# Patient Record
Sex: FEMALE | URBAN - METROPOLITAN AREA
[De-identification: names, ages, dates, MRNs, and addresses within clinical notes are randomized per-mention and may not be internally consistent; named-entity substitution may affect disease eponyms.]

---

## 2024-01-03 DIAGNOSIS — Z31.41 FERTILITY TESTING: ICD-10-CM

## 2024-01-03 DIAGNOSIS — Z01.812 ENCOUNTER FOR PREPROCEDURAL LABORATORY EXAMINATION: ICD-10-CM

## 2024-01-03 DIAGNOSIS — Z31.7 ENCOUNTER FOR PROCREATIVE MANAGEMENT AND COUNSELING FOR GESTATIONAL CARRIER: ICD-10-CM

## 2024-01-13 ASSESSMENT — LIFESTYLE VARIABLES
TOBACCO_USE: NO
HISTORY_ALCOHOL_USE: NO

## 2024-01-17 ENCOUNTER — PREP FOR PROCEDURE (OUTPATIENT)
Dept: ENDOCRINOLOGY | Facility: CLINIC | Age: 34
End: 2024-01-17

## 2024-01-17 ENCOUNTER — HOSPITAL ENCOUNTER (OUTPATIENT)
Dept: ENDOCRINOLOGY | Facility: CLINIC | Age: 34
Discharge: HOME | End: 2024-01-17

## 2024-01-17 ENCOUNTER — TELEMEDICINE (OUTPATIENT)
Dept: BEHAVIORAL HEALTH | Facility: CLINIC | Age: 34
End: 2024-01-17
Payer: COMMERCIAL

## 2024-01-17 ENCOUNTER — ANCILLARY PROCEDURE (OUTPATIENT)
Dept: ENDOCRINOLOGY | Facility: CLINIC | Age: 34
End: 2024-01-17

## 2024-01-17 ENCOUNTER — CONSULT (OUTPATIENT)
Dept: ENDOCRINOLOGY | Facility: CLINIC | Age: 34
End: 2024-01-17

## 2024-01-17 VITALS
HEART RATE: 83 BPM | DIASTOLIC BLOOD PRESSURE: 75 MMHG | WEIGHT: 155 LBS | SYSTOLIC BLOOD PRESSURE: 118 MMHG | HEIGHT: 62 IN | TEMPERATURE: 97.9 F | BODY MASS INDEX: 28.52 KG/M2

## 2024-01-17 VITALS
RESPIRATION RATE: 20 BRPM | BODY MASS INDEX: 28.6 KG/M2 | HEART RATE: 82 BPM | SYSTOLIC BLOOD PRESSURE: 135 MMHG | DIASTOLIC BLOOD PRESSURE: 83 MMHG | WEIGHT: 155.42 LBS | HEIGHT: 62 IN | OXYGEN SATURATION: 96 % | TEMPERATURE: 98.1 F

## 2024-01-17 DIAGNOSIS — Z31.69 INFERTILITY COUNSELING: Primary | ICD-10-CM

## 2024-01-17 DIAGNOSIS — Z31.7 ENCOUNTER FOR PROCREATIVE MANAGEMENT AND COUNSELING FOR GESTATIONAL CARRIER: Primary | ICD-10-CM

## 2024-01-17 DIAGNOSIS — Z31.7 ENCOUNTER FOR PROCREATIVE MANAGEMENT AND COUNSELING FOR GESTATIONAL CARRIER: ICD-10-CM

## 2024-01-17 LAB — PREGNANCY TEST URINE, POC: NEGATIVE

## 2024-01-17 PROCEDURE — 99215 OFFICE O/P EST HI 40 MIN: CPT | Performed by: NURSE PRACTITIONER

## 2024-01-17 PROCEDURE — 99205 OFFICE O/P NEW HI 60 MIN: CPT | Performed by: NURSE PRACTITIONER

## 2024-01-17 PROCEDURE — 64450 NJX AA&/STRD OTHER PN/BRANCH: CPT | Performed by: OBSTETRICS & GYNECOLOGY

## 2024-01-17 PROCEDURE — 58555 HYSTEROSCOPY DX SEP PROC: CPT | Performed by: OBSTETRICS & GYNECOLOGY

## 2024-01-17 PROCEDURE — 90791 PSYCH DIAGNOSTIC EVALUATION: CPT | Performed by: PSYCHOLOGIST

## 2024-01-17 RX ORDER — KETOROLAC TROMETHAMINE 30 MG/ML
30 INJECTION, SOLUTION INTRAMUSCULAR; INTRAVENOUS ONCE AS NEEDED
Status: CANCELLED | OUTPATIENT
Start: 2024-01-17 | End: 2024-01-22

## 2024-01-17 RX ORDER — ACETAMINOPHEN 325 MG/1
650 TABLET ORAL ONCE AS NEEDED
Status: DISCONTINUED | OUTPATIENT
Start: 2024-01-17 | End: 2024-01-18 | Stop reason: HOSPADM

## 2024-01-17 RX ORDER — ACETAMINOPHEN 325 MG/1
650 TABLET ORAL ONCE AS NEEDED
Status: CANCELLED | OUTPATIENT
Start: 2024-01-17

## 2024-01-17 RX ORDER — KETOROLAC TROMETHAMINE 30 MG/ML
30 INJECTION, SOLUTION INTRAMUSCULAR; INTRAVENOUS ONCE AS NEEDED
Status: DISCONTINUED | OUTPATIENT
Start: 2024-01-17 | End: 2024-01-18 | Stop reason: HOSPADM

## 2024-01-17 ASSESSMENT — PAIN SCALES - GENERAL
PAINLEVEL: 0-NO PAIN
PAINLEVEL_OUTOF10: 0 - NO PAIN
PAINLEVEL_OUTOF10: 0 - NO PAIN

## 2024-01-17 ASSESSMENT — PAIN - FUNCTIONAL ASSESSMENT: PAIN_FUNCTIONAL_ASSESSMENT: 0-10
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
## 2024-01-17 NOTE — PROGRESS NOTES
Boarding Pass Gestational Carrier Checklist    Age: 33 y.o.      Provider: Coby Tolliver, CNP  Primary RN: Viviana Moore  Reasons for Treatment:  Gestational Carrier for Katty Wallace  Last BMI  24 : 28.43 kg/m²       OB HISTORY  2013 FT , Spontaneous labor at term, no complications in pregnancy, no complications with delivery, no complications postpartum,  x 6 months, menses resumed while breastfeeding  3/2015 FT , Spontaneous labor at term, no complications in pregnancy, no complications with delivery, no complications postpartum,  x 2 months, menses resumed after breastfeeding ceased   2018 FT , Spontaneous labor at term, no complications in pregnancy, no complications with delivery, no complications postpartum, did not breastfeed, menses resumed 6 weeks postpartum     No past medical history on file.  Ectopic Risk: No    Date Done Consultation Results/Comments    Prenatal Records Reviewed: Yes (Viviana Moore RN)    24 Provider Consult Coby Tolliver   23 +  24 Psych Okay to proceed? Yes  Has already done psychology eval, meeting with [facility] on  *Fady needs PRACHI-- completed and normal   24 GC Consent Received and in chart: Yes (Viviana Moore RN)   24 Nursing Teaching: Yes (Viviana Moore, RN)  Cycle Calendar: Yes (Viviana Moore RN)   3/2024 Legal Clearance letter received date: 3/12/24   8/31/23 OB Clearance Clearance for pregnancy: Yes (Viviana Moore RN)    Carrier Insurance Carrier Insurance Verified with IP: Agency to ensure insurance is verified    MFM Consult Okay to proceed? N/A    Other    Date Done Female Labs Results/Comments   24 Physical Exam Yes (Viviana Moore RN) by Coby Tolliver   24 Uterine Cavity Eval HSG:   SIS:   Hyster: (2024) Procedure: Diagnostic Hysteroscopy   Preop diagnosis: fertility testing  Post op diagnosis: Same   Assistant: Dr. Andrade    Anesthesia: None   IV: None   EBL: 3 cc  Specimens: None    Complications: None   Risks benefits and alternatives of the procedure explained to the patient and informed consent was obtained. Urine pregnancy test was performed and was negative. Time out was performed. The patient was placed in the dorsal lithotomy position and a sterile speculum was placed in the vagina. The cervix was sterilized with Betadine x3. Paracervical block with lidocaine 1% was administered.   Tenaculum: No  Dilation: No  The hysteroscope was placed in the cervix and advanced into the uterine cavity. Normal saline was used for distension media. Images were obtained and findings noted as below.   All instruments were then removed. Good hemostasis was noted. Patient tolerated the procedure well returned to the recovery area in stable condition. .   Findings:   Cavity: Smooth cavity no lesions noted  Ostia: Bilateral tubal ostia visualized  Additional Notes:    Attending Attestation: I was physically present for key and critical portions performed by the fellow. I reviewed the fellow's documentation and discussed the patient with the fellow. I agree with the fellow's medical decision making as documented in the fellow's note.             Covid Vaccine No; Intended Parent Informed by Provider & GC counseled on risk with documentation from provider in chart:    N/A FDA Waiver (if applicable) Reason for ineligibility: N/A  Date signed: N/A   1/17/24 Urine Toxicology Amphetamine: NEGATIVE  Barbiturate: NEGATIVE  Benzodiazepines: NEGATIVE  Cannabinoid: NEGATIVE  Cocaine: NEGATIVE  Fentanyl: NEGATIVE  Methadone: NEGATIVE  Oxycodone: NEGATIVE  Opiate: NEGATIVE  PCP: NEGATIVE    Nicotine Screening (if applicable) N/A   1/17/24 T&S (Q 1 Year) ABO: O  Rh: POS  Antibody: NEG   1/17/24 Hep B sAg NONREACTIVE   1/17/24 Hep C AB NONREACTIVE   1/17/24 HIV NONREACTIVE   1/17/24 Syphilis NONREACTIVE   1/17/24 GC/CT GC: NEGATIVE  CT: NEGATIVE   1/17/24 Rubella (Q 5 Years) IMMUNE     1/17/24 Varicella (Q 5 Years)  IMMUNE     1/17/24 CMV IgG: REACTIVE  IgM: NEGATIVE   8/31/23 Pap Smear (Q 5 Years) NEGATIVE FOR INTRAEPITHELIAL LESION OR MALIGNANCY  HPV: NEGATIVE    Mammogram ( > 40) (Q 1 Year) N/A   Date Done Male Labs (if applicable)  Fady Ervin [birth year] Results/Comments   1/17/24 Hep B sAg Nonreactive   1/17/24 Hep C AB  Nonreactive   1/17/24 HIV Nonreactive   1/17/24 Syphilis Nonreactive   1/17/24 GC/CT Negative   N/A FDA Waiver (if applicable) Date Signed: N/A   Date Done Miscellaneous Results/Comments    >= 45 Checklist  Added to chart:   No    [facility] Interval FET Checklist Added to chart:   SEE IP'S CHART   **Does not need to be completed prior to placing on IVF calendar**    Reviewed and approved by JALYN RODRIGUEZ on 3/12/24 at 3:47 PM.

## 2024-01-17 NOTE — PROGRESS NOTES
Patient ID: Dixie Ervin is a 33 y.o. female.    Hysteroscopy diagnostic    Date/Time: 1/17/2024 2:54 PM    Performed by: Ger Pizarro MD  Authorized by: SUREKHA Wolfe    Consent:     Consent obtained:  Verbal and written    Consent given by:  Patient    Risks, benefits, and alternatives were discussed: yes      Risks discussed:  Bleeding, infection and pain  Universal protocol:     Procedure explained and questions answered to patient or proxy's satisfaction: yes      Relevant documents present and verified: yes      Test results available: yes      Imaging studies available: yes      Required blood products, implants, devices, and special equipment available: yes      Immediately prior to procedure, a time out was called: yes      Patient identity confirmed:  Verbally with patient, arm band and hospital-assigned identification number  Pre-procedure details:     Skin preparation:  Povidone-iodine  Sedation:     Sedation type:  None  Anesthesia:     Anesthesia method:  Nerve block    Block anesthetic:  Lidocaine 1% w/o epi    Block technique:  Paracervical block  Procedure specific details:      Procedure: Diagnostic Hysteroscopy   Preop diagnosis: fertility testing  Post op diagnosis: Same   Assistant: Dr. Adnrade    Anesthesia: None   IV: None   EBL: 3 cc  Specimens: None   Complications: None   Risks benefits and alternatives of the procedure explained to the patient and informed consent was obtained. Urine pregnancy test was performed and was negative. Time out was performed. The patient was placed in the dorsal lithotomy position and a sterile speculum was placed in the vagina. The cervix was sterilized with Betadine x3. Paracervical block with lidocaine 1% was administered.   Tenaculum: No  Dilation: No  The hysteroscope was placed in the cervix and advanced into the uterine cavity. Normal saline was used for distension media. Images were obtained and findings noted as below.   All instruments  were then removed. Good hemostasis was noted. Patient tolerated the procedure well returned to the recovery area in stable condition. .   Findings:   Cavity: Smooth cavity no lesions noted  Ostia: Bilateral tubal ostia visualized  Additional Notes:    Attending Attestation: I was physically present for key and critical portions performed by the fellow. I reviewed the fellow's documentation and discussed the patient with the fellow. I agree with the fellow's medical decision making as documented in the fellow's note.           Post-procedure details:     Procedure completion:  Tolerated well, no immediate complications

## 2024-01-17 NOTE — PROGRESS NOTES
"Psychoeducational Consultation for Third Party Reproduction    Start Time 1 PM  End Time 1:20 PM  Documentation 10 min  No tobacco use no falls    On January 17, I met virtually with Dixie and Fady Ervin and Katty Wallace and Peter Somers.  Dxiie was matched with Katty and Peter through [facility] to be a gestational carrier (GC).  Dixie was evaluated by an ASRM PG counselor through [employer] and she was deemed an appropriate candidate to be a GC (see scanned report under \"MEDIA\").  She also took the PRACHI with the agency and her results were within normal limits.  Fady had not taken the PRACHI so [facility]. His first attempt was invalid and he repeated the testing 2 a few hours later.  Although he clearly answered in a style placing himself in a positive light, there is no indication of psychopathology and is within normal limits.    The discussion with the two couples focused on communication and decision making regarding pregnancy decisions.  Both couples agree that Dixie would terminate a pregnancy if her health was at risk, if the prognosis for a fetus was dire (incompatible with life) and would selectively reduce to twins in the unlikely event of a multifetal pregnancy beyond twins.  Katty and Peter will attend the delivery but not be present in the delivery room. They will attend some of the more important prenatal visits. The couples plan to have contact after delivery (e.g. updates on the baby).    The couples had spent some time today getting to know each other better and believe that they are aligned in important ways and that the agency matched them well.    It is my clinical impression that both couples are able to give informed consent and have carefully considered the psychosocial issues and decision making that may impact all of them regarding this third party reproductive option.  "

## 2024-01-17 NOTE — LETTER
"January 17, 2024     Viviana Moore, JESSY    Patient: Dixie Ervin   YOB: 1990   Date of Visit: 1/17/2024       Dear Dr. Viviana Moore, RN:    Thank you for referring Dixie Ervin to me for evaluation. Below are my notes for this consultation.  If you have questions, please do not hesitate to call me. I look forward to following your patient along with you. I can't seem to get Coby's email to generate so please forward to her.       Sincerely,     Maame Rasmussen, PhD      CC: No Recipients  ______________________________________________________________________________________    Psychoeducational Consultation for Third Party Reproduction    Start Time 1 PM  End Time 1:20 PM  Documentation 10 min  No tobacco use no falls    On January 17, I met virtually with Dixie and Fady Ervin and Katty Wallace and Peter Somers.  Dixie was matched with Marilyn through [facility] to be a gestational carrier (GC).  [facility] and she was deemed an appropriate candidate to be a GC (see scanned report under \"MEDIA\").  She also took the PRACHI with the agency and her results were within normal limits.  Fady had not taken the PRACHI so he took it at the  [facility] twice. His first attempt was invalid and he repeated the testing 2 a few hours later.  Although he clearly answered in a style placing himself in a positive light, there is no indication of psychopathology and is within normal limits.    The discussion with the two couples focused on communication and decision making regarding pregnancy decisions.  Both couples agree that Dixie would terminate a pregnancy if her health was at risk, if the prognosis for a fetus was dire (incompatible with life) and would selectively reduce to twins in the unlikely event of a multifetal pregnancy beyond twins.  Marilyn will attend the delivery but not be present in the " delivery room. They will attend some of the more important prenatal visits. The couples plan to have contact after delivery (e.g. updates on the baby).    The couples had spent some time today getting to know each other better and believe that they are aligned in important ways and that the agency matched them well.    It is my clinical impression that both couples are able to give informed consent and have carefully considered the psychosocial issues and decision making that may impact all of them regarding this third party reproductive option.

## 2024-01-23 ENCOUNTER — DOCUMENTATION (OUTPATIENT)
Dept: ENDOCRINOLOGY | Facility: CLINIC | Age: 34
End: 2024-01-23
Payer: COMMERCIAL

## 2024-01-23 NOTE — PROGRESS NOTES
Reviewed notes in EMR and she has no chronic health conditions, surgical history, or history of obstetrical issues that would preclude her from being a gestational carrier. She has passed psychological clearance with Dr Rasmussen. Ok to proceed with legal contracts.     OB HISTORY  2013 FT , Spontaneous labor at term, no complications in pregnancy, no complications with delivery, no complications postpartum,  x 6 months, menses resumed while breastfeeding  3/2015 FT , Spontaneous labor at term, no complications in pregnancy, no complications with delivery, no complications postpartum,  x 2 months, menses resumed after breastfeeding ceased   2018 FT , Spontaneous labor at term, no complications in pregnancy, no complications with delivery, no complications postpartum, did not breastfeed, menses resumed 6 weeks postpartum     Viviana Lowe MD

## 2024-03-12 DIAGNOSIS — Z31.83 ENCOUNTER FOR ASSISTED REPRODUCTIVE FERTILITY CYCLE: ICD-10-CM

## 2024-03-12 DIAGNOSIS — N97.9 FEMALE INFERTILITY: ICD-10-CM

## 2024-03-13 RX ORDER — PROPYLENE GLYCOL/PEG 400 0.3 %-0.4%
DROPS OPHTHALMIC (EYE)
Qty: 30 EACH | Refills: 3 | Status: SHIPPED | OUTPATIENT
Start: 2024-03-13 | End: 2024-06-10

## 2024-03-13 RX ORDER — SYRINGE, DISPOSABLE, 3 ML
SYRINGE, EMPTY DISPOSABLE MISCELLANEOUS
Qty: 30 EACH | Refills: 3 | Status: SHIPPED | OUTPATIENT
Start: 2024-03-13

## 2024-03-13 RX ORDER — LIDOCAINE AND PRILOCAINE 25; 25 MG/G; MG/G
CREAM TOPICAL DAILY
Qty: 30 G | Refills: 2 | Status: SHIPPED | OUTPATIENT
Start: 2024-03-13 | End: 2025-03-13

## 2024-03-13 RX ORDER — PROGESTERONE 50 MG/ML
75 INJECTION, SOLUTION INTRAMUSCULAR DAILY
Qty: 50 ML | Refills: 3 | Status: SHIPPED | OUTPATIENT
Start: 2024-03-13 | End: 2024-06-11

## 2024-03-13 RX ORDER — ESTRADIOL 2 MG/1
6 TABLET ORAL DAILY
Qty: 90 TABLET | Refills: 2 | Status: SHIPPED | OUTPATIENT
Start: 2024-03-13 | End: 2024-03-15

## 2024-03-15 DIAGNOSIS — N97.9 FEMALE INFERTILITY: ICD-10-CM

## 2024-03-15 RX ORDER — ESTRADIOL 2 MG/1
6 TABLET ORAL DAILY
Qty: 90 TABLET | Refills: 2 | Status: SHIPPED | OUTPATIENT
Start: 2024-03-15 | End: 2025-03-15

## 2024-03-29 ENCOUNTER — TELEPHONE (OUTPATIENT)
Dept: ENDOCRINOLOGY | Facility: CLINIC | Age: 34
End: 2024-03-29
Payer: COMMERCIAL

## 2024-03-29 NOTE — TELEPHONE ENCOUNTER
LM with patient to call office on Monday to discuss restarting cycle.      SHREE ROCK on 3/29/24 at 4:09 PM.

## 2024-04-01 ENCOUNTER — DOCUMENTATION (OUTPATIENT)
Dept: ENDOCRINOLOGY | Facility: CLINIC | Age: 34
End: 2024-04-01

## 2024-04-01 DIAGNOSIS — N97.9 FEMALE INFERTILITY: ICD-10-CM

## 2024-04-01 DIAGNOSIS — N92.6 ABNORMAL MENSTRUAL CYCLE: ICD-10-CM
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
## 2024-04-04 NOTE — PROGRESS NOTES
Patient is a gestational carrier, skipped March menses, has had many negative UPTs. Awaiting menses to get set up for FET cycle. Pt states she skipped a period like this ~8 years ago or so when she changed jobs and was more active, etc and this change occurred recently as well.  Patient got E2, BHCG and P4 done at a lab in [place] on 4/2.    E2= 227  BHCG= negative  P4= 0.29    Will discuss with team and let patient and IP know the plan.    04/04/24 at 11:25 AM - Vivaina Moore, JESSY    Monitoring patient: patient skipped her March menses, multiple negative UPTs. She is a GC getting set up for a future FET. Labs on 4-2-2024: E2-227, HCG negative, & P4- 0.29. Patient to repeat E2, LH, P4, & HCG on 4-8-2024. Plan to be communicated by RN. Plan agreed upon by Dr. Pizarro. Lissa Johansen, ARIELLE 04/04/24 12:46 PM     Let patient know this plan, will repeat lab work Monday plus LH. Told patient to try to find somewhere that will result them within a few hours or same day if possible to ensure real time results since things can change within a couple days. Sent orders to patient.  Viviana Moore, 04/04/2024 & 2:03 PM

## 2024-04-08 NOTE — PROGRESS NOTES
Patient repeating lab work that was previously done on 4/2/24 to assess cycle due to skipping March menses. Patient is a GC trying to get set up for FET>    4/2/24  E2= 227  BHCG= negative  P4= 0.29    4/4/24  E2=  LH=  P4=  BHCG=    04/08/24 at 11:54 AM - Viviana Moore RN    Results not back will follow up tomorrow.    04/08/24 at 5:14 PM - Viviana Moore RN

## 2024-04-09 ENCOUNTER — APPOINTMENT (OUTPATIENT)
Dept: ENDOCRINOLOGY | Facility: CLINIC | Age: 34
End: 2024-04-09
Payer: COMMERCIAL

## 2024-04-11 NOTE — PROGRESS NOTES
Patient got repeat lab work completed on 4/9 to assess cycle, patient is a GC and missed her March menses (awaiting FET set up, cleared to proceed), patient changed jobs and is more active and said she skipped a menses years ago when similar situation occurred.    Labs 4/2/24:  E2- 227  BHCG- negative  P4- 0.3    Labs 4/9/24:  E2- 175  BHCG- negative  P4- 8.0  LH- 4.33    Told patient and IP that progesterone >4 indicates ovulation, will discuss with providers and confirm and let patient know if plan changes, but likely will just wait for menses to occur within the next 2 weeks, likely next weekend. Pt to start 6mg estrace with menses and call the office, if menses fall over the weekend start medication and call Monday for FET set up.    04/11/24 at 8:09 AM - Viivana Moore RN    Monitoring patient, pt is a GC and missed her March menses, Repeat P4 Level on 4-9-2024= 8.0, LH 4.33 & HCG negative- patient will await menses and plan to start Estrace 6 mg day 1 of full flow and call for FET set up. Plan to be communicated by RN. Plan agreed upon by Dr. Pizarro. Lissa Johansen, ARIELLE 04/11/24 9:55 AM

## 2024-04-22 NOTE — PROGRESS NOTES
Patient called with menses for FET setup.  GC for IP's Katty Wallace and Peter Somers    LMP: 4/19/24  Plan: programmed FET 75mg ROBIN  Tentative lining check: 5/1 (CD 13) *In [place]  Tentative progesterone start: 5/4  Tentative transfer date: 5/9 (CD 21)  Number of days of progesterone for transfer: 6  Treatment plan: **message to sent to IP's to complete ASAP  Embryo # confirmed: 2 euploid, 1 low level mosaic, 2 untested  Embryo # to transfer: 1 PGTA WNL    Consents signed? Yes (IP's signed)  Boarding Pass signed off? Yes    04/22/24 at 9:37 AM - JESSY Lemus 04/22/24 12:56 PM      Patient, IP's and agency aware of plan, fax sent to [facility] in [place] for monitoring appointment on 5/1, will also bring physical forms to the appointment (sent via email).  IP's aware to sign treatment plan tonight so it does not delay transfer.    Address: [facility]  Phone: [phone]  Fax: [phone]    04/22/24 at 2:48 PM - Viviana Moore RN

## 2024-04-25 ENCOUNTER — TELEPHONE (OUTPATIENT)
Dept: ENDOCRINOLOGY | Facility: CLINIC | Age: 34
End: 2024-04-25

## 2024-04-25 NOTE — TELEPHONE ENCOUNTER
Reason for call: Call Back  Notes: Pt has questions about her monitoring for 5/1. She isn't sure she can have her US done at the hospital closest to her. She isn't sure if we have a preference for where she goes or not.

## 2024-04-25 NOTE — TELEPHONE ENCOUNTER
Returned patient's call - patient is in a smaller town in [place], and was told the hospital she was originally planning to go to does not do this type of ultrasound for lining check, and does not normally do fertility treatment. Patient also stated most labs around her do not do same day results. Patient is going to research clinics, or outpatient offices near her that she can go to instead on 5/1, and see if we can fax the orders to this place. Patient will send a mychart with options by tomorrow.      SHREE ROCK on 4/25/24 at 3:40 PM.

## 2024-04-26 ENCOUNTER — TELEPHONE (OUTPATIENT)
Dept: ENDOCRINOLOGY | Facility: CLINIC | Age: 34
End: 2024-04-26

## 2024-04-26 NOTE — PROGRESS NOTES
Patient instructed this RN faxed US and lab work orders to provided fax numbers. Patient inquiring [facility] to [facility]. This RN sent message to pharmacy to confirm medications have been re routed to pharmacy.   ERLIN ESPINOZA on 4/26/24 at 4:47 PM.

## 2024-04-26 NOTE — TELEPHONE ENCOUNTER
Reason for call:   Notes: Pt needs US and lab orders for her 5/1 monitoring appointment faxed to local clinic. Please fax US orders to [phone] and fax lab orders to [phone].

## 2024-04-26 NOTE — TELEPHONE ENCOUNTER
LM with patient to call office  back or send [employer] message with US places to send orders to for lining check on 5/1.      SHREE ROCK on 4/26/24 at 10:30 AM.

## 2024-04-26 NOTE — TELEPHONE ENCOUNTER
Reason for call: Patient calling to talk to a nurse about her medication orders and US for surrogacy.  Notes:

## 2024-05-01 LAB
ESTRADIOL (PG/ML) IN SER/PLAS EXTERNAL: 162 PG/ML
PROGESTERONE (NG/ML) IN SER/PLAS EXTERNAL: 0.3 NG/ML

## 2024-05-01 NOTE — PROGRESS NOTES
Patient has lining check today at clinic in [place]. Patient is a GC for Katty Wallace and Peter Somers. Will keep an eye out for results, may not get lab work results until tomorrow due to lab turn around time.    Plan is to start ROBIN on 5/4 for a transfer on 5/9.    05/01/24 at 8:29 AM - Viviana Moore RN    Images received and reviewed by Dr. Pizarro- awaiting lab work, will add to noon huddle for tomorrow.    05/01/24 at 3:29 PM - Viviana Moore RN

## 2024-05-02 NOTE — PROGRESS NOTES
Patient has lining check yesterday 5/1 at clinic in [place]. Patient is a GC for Katty Wallace and Peter Somers. Plan is to start ROBIN on 5/4 for a transfer on 5/9.     Viviana Moore 05/02/24 10:47 AM     [facility] PROVIDER NOTE  Ultrasound and/or labs reviewed at [facility].   Results for orders placed or performed in visit on 05/02/24 (from the past 96 hour(s))   Progesterone   Result Value Ref Range    Progesterone External 0.3 ng/mL   Estradiol   Result Value Ref Range    Estradiol External 162 pg/mL       Thu 5/2 (Day 14)   estradiol tablet: Take 6 mg once daily by mouth    Fri 5/3 (Day 15)   estradiol tablet: Take 6 mg once daily by mouth    Sat 5/4 (Day 16)   progesterone injection: Inject 75 mg once daily into the muscle   estradiol tablet: Take 6 mg once daily by mouth    Sun 5/5 (Day 17)   progesterone injection: Inject 75 mg once daily into the muscle   estradiol tablet: Take 6 mg once daily by mouth    Mon 5/6 (Day 18)   progesterone injection: Inject 75 mg once daily into the muscle   estradiol tablet: Take 6 mg once daily by mouth    Tue 5/7 (Day 19)   progesterone injection: Inject 75 mg once daily into the muscle   estradiol tablet: Take 6 mg once daily by mouth    Wed 5/8 (Day 20)   progesterone injection: Inject 75 mg once daily into the muscle   estradiol tablet: Take 6 mg once daily by mouth    Thu 5/9 (Day 21)   progesterone injection: Inject 75 mg once daily into the muscle   estradiol tablet: Take 6 mg once daily by mouth      Pt to draw progesterone on Friday (will likely get results back on Saturday). Will review on Saturday noon [facility].     RTC on 5/9 for embryo transfer.    Maddie Andrade  05/02/2024  12:48 PM      Discussed with GC and IP's-- scan looks appropriate and so does lab work. Explained there is a 16mm follicle but P4 level is low, unlikely to ovulate but would like to check another P4 level tomorrow (Friday) to confirm it is still low, providers aware patient's lab in [place]  takes a day or so to turn around labs and that this okay, patient can take first ROBIN injection without these results and aware is P4 level is elevated we would cancel cycle. Will fax order to lab in [place], results should cross over in [facility]. Will call patient this afternoon to discuss more about ROBIN administration. Aware they will get a call the day prior to FET with a time and to arrive with a full bladder.  IP's- Katty Wallace and Peter Somers in case they need contacted over the weekend.    Viviana Moore 05/02/24 2:16 PM

## 2024-05-05 NOTE — PROGRESS NOTES
[facility] PROVIDER NOTE  Ultrasound and/or labs reviewed at [facility].   No results found for this or any previous visit (from the past 96 hour(s)).  Sun 5/5 (Day 17)   progesterone injection: Inject 75 mg once daily into the muscle   estradiol tablet: Take 6 mg once daily by mouth    Mon 5/6 (Day 18)   progesterone injection: Inject 75 mg once daily into the muscle   estradiol tablet: Take 6 mg once daily by mouth    Tue 5/7 (Day 19)   progesterone injection: Inject 75 mg once daily into the muscle   estradiol tablet: Take 6 mg once daily by mouth    Wed 5/8 (Day 20)   progesterone injection: Inject 75 mg once daily into the muscle   estradiol tablet: Take 6 mg once daily by mouth    Thu 5/9 (Day 21)   progesterone injection: Inject 75 mg once daily into the muscle   estradiol tablet: Take 6 mg once daily by mouth    Progesterone from outside lab returned at 0.34 from 5/3/24.  Patient to continue meds and RTC for FET as planned.   Ger Pizarro  05/04/2024  10:27 AM

## 2024-05-09 ENCOUNTER — HOSPITAL ENCOUNTER (OUTPATIENT)
Dept: ENDOCRINOLOGY | Facility: CLINIC | Age: 34
Discharge: HOME | End: 2024-05-09

## 2024-05-09 ENCOUNTER — PREP FOR PROCEDURE (OUTPATIENT)
Dept: ENDOCRINOLOGY | Facility: CLINIC | Age: 34
End: 2024-05-09

## 2024-05-09 DIAGNOSIS — N97.9 FEMALE INFERTILITY: ICD-10-CM

## 2024-05-09 DIAGNOSIS — Z32.00 UNCONFIRMED PREGNANCY: Primary | ICD-10-CM

## 2024-05-09 DIAGNOSIS — Z31.83 ENCOUNTER FOR ASSISTED REPRODUCTIVE FERTILITY CYCLE: ICD-10-CM

## 2024-05-09 LAB — PROGEST SERPL-MCNC: 35.3 NG/ML

## 2024-05-09 PROCEDURE — 76998 US GUIDE INTRAOP: CPT | Performed by: OBSTETRICS & GYNECOLOGY

## 2024-05-09 PROCEDURE — 36415 COLL VENOUS BLD VENIPUNCTURE: CPT

## 2024-05-09 PROCEDURE — 89255 PREPARE EMBRYO FOR TRANSFER: CPT | Performed by: OBSTETRICS & GYNECOLOGY

## 2024-05-09 PROCEDURE — 89352 THAWING CRYOPRESRVED EMBRYO: CPT | Performed by: OBSTETRICS & GYNECOLOGY

## 2024-05-09 RX ORDER — ACETAMINOPHEN 325 MG/1
650 TABLET ORAL ONCE AS NEEDED
Status: CANCELLED | OUTPATIENT
Start: 2024-05-09

## 2024-05-09 RX ORDER — ACETAMINOPHEN 325 MG/1
650 TABLET ORAL ONCE AS NEEDED
Status: DISCONTINUED | OUTPATIENT
Start: 2024-05-09 | End: 2024-05-10 | Stop reason: HOSPADM

## 2024-05-09 NOTE — DISCHARGE INSTRUCTIONS
[facility]  [address]. [place]  [address]  Tel: [phone]   Fax: [phone]    Home Going Instructions after Embryo Transfer:     After completing your embryo transfer please treat your body as though you are pregnant.  No smoking, alcohol, or recreational drugs.  Make sure you are taking a prenatal vitamin daily.   Continue all medications currently prescribed for embryo transfer until otherwise instructed by your physician, even if you experience spotting or bleeding and even if you have a negative home pregnancy test.   Medications to avoid in pregnancy: Advil, Motrin, Ibuprofen, Aleve, Excedrin, Latosha[place], Sudafed, and Pepto-Bismol. Avoid aspirin unless you are taking this daily at the direction of your physician.   Medications that are generally safe in pregnancy: Tylenol, Benadryl, Plain Robitussin, Zyrtec, Claritin, Pepcid, Tums, Rolaids, Mylanta, Nasonex.   Foods to avoid:   Seafood that is high in mercury; you can have canned tuna twice a week.   Deli meats, deli salads, hot dogs, and unpasteurized cheeses due to the risk of Listeria.  Activities to avoid:   No hot tubs, whirlpools, or saunas.  Avoid starting new exercise routines that you have not done previously.  Avoid lifting > 30 lbs.  No cleaning litter boxes.  No intercourse until you test for pregnancy.   You do not need to be on bed rest following the transfer. It is healthy, normal, and recommended to go about routine physical activity.   We advise against taking a home pregnancy test due to the possibility of false negative and false positive results.   You will test for pregnancy in approximately 10 days, at which point you will be about 4 weeks pregnant.   If blood work was drawn on the day of your transfer, you can expect a phone that day with the results of your bloodwork. We expect a progesterone level greater than or equal to 16. If you level is less than 16  we will adjust your progesterone dose and repeat your level in 2 days.     Lourdes Rodríguez    11:05 AM

## 2024-05-09 NOTE — ADDENDUM NOTE
Encounter addended by: Viviana Lowe MD on: 5/9/2024 12:15 PM   Actions taken: Clinical Note Signed, SmartForm saved

## 2024-05-09 NOTE — ADDENDUM NOTE
Encounter addended by: Lamberto Oates MT on: 5/9/2024 12:46 PM   Actions taken: Charge Capture section accepted

## 2024-05-09 NOTE — PROGRESS NOTES
Patient ID: Dixie Ervin is a 33 y.o. female.    Embryo Transfer    Date/Time: 5/9/2024 11:54 AM    Performed by: Viviana Lowe MD  Authorized by: SUREKHA Wolfe    Consent:     Consent obtained:  Written    Consent given by:  Patient    Procedure risks and benefits discussed: yes      Patient questions answered: yes      Patient agrees, verbalizes understanding, and wants to proceed: yes      Educational handouts given: yes      Instructions and paperwork completed: yes    Procedure:     Pelvic exam performed: No      Cervix cleaned and prepped: Yes      Speculum placed in vagina: Yes      Ultrasound guidance: Yes      Catheter type:  Sure View Abdalla    Difficulty: easy      Estimated blood loss:  None  Post-procedure:     Patient tolerated procedure well: yes    Comments:      Preop diagnosis: Infertility  Post op diagnosis: Same  Assistant: Bill  Depth: 7.5 cm  Curve: 15  Distance from fundus: 12 mm  Embryo stage at day of transfer: day 5  Embryo notes: 4AA euploid   Additional Notes:  subendometrial cysts noted, possibly consistent with adenomyosis. Initial concern for endometrial fluid however on TV views this was not apparent.   Anesthesia: None    IV Fluids: None  UOP: Not recorded  Specimens: None  Complications: None  This replaces an operative report.    Attending Attestation: I was physically present for key and critical portions performed by the fellow. I reviewed the fellow's documentation and discussed the patient with the fellow. I agree with the fellow's medical decision making as documented in the fellow's note.   Viviana Lowe 05/09/24 11:54 AM

## 2024-05-20 NOTE — PROGRESS NOTES
GC's first [facility] s/p FET 5/9/24. Will call pt with results and plan.   Erick Keys 05/20/24 11:30 AM    Results not received. I called pt and she states she did not go until Noon [place] time. Will call her tomorrow with results. Mee Foss and PETRONA Tolliver CNP notified via Glenny Keys 05/20/24 5:56 PM

## 2024-05-21 ENCOUNTER — APPOINTMENT (OUTPATIENT)
Dept: ENDOCRINOLOGY | Facility: CLINIC | Age: 34
End: 2024-05-21

## 2024-05-21 DIAGNOSIS — Z32.01 POSITIVE BLOOD PREGNANCY TEST (HHS-HCC): Primary | ICD-10-CM

## 2024-05-21 NOTE — PROGRESS NOTES
Attempted to call pt to review plan of care, no answer LVM to call back. Advised pt on voice message to repeat hcg next Tuesday 5/28/2024 order in [employer] system. Advised pt continue taking meds  Reviewed and approved by OLAMIDE ROMAN on 5/21/24 at 2:26 PM.

## 2024-05-24 ENCOUNTER — TELEPHONE (OUTPATIENT)
Dept: ENDOCRINOLOGY | Facility: CLINIC | Age: 34
End: 2024-05-24

## 2024-05-24 NOTE — TELEPHONE ENCOUNTER
Reason for call: Patient has to get some lab work done on Tuesday she wants her orders to be sent to her clinic  Notes:

## 2024-05-24 NOTE — TELEPHONE ENCOUNTER
TC to pt - she will be getting her hCG level drawn in [facility] at about noon their time, so this clinic will likley be closed by the time the lab is resulted. RN moved her to Wed 5/28 [facility] and will fax order to the clinic as requested.    [facility]  FAX:  [phone]    Anh Cordovaaney 05/24/24 3:49 PM

## 2024-05-28 ENCOUNTER — APPOINTMENT (OUTPATIENT)
Dept: ENDOCRINOLOGY | Facility: CLINIC | Age: 34
End: 2024-05-28

## 2024-05-29 DIAGNOSIS — O36.80X0 ENCOUNTER TO DETERMINE FETAL VIABILITY OF PREGNANCY, NOT APPLICABLE OR UNSPECIFIED FETUS (HHS-HCC): ICD-10-CM

## 2024-05-29 NOTE — PROGRESS NOTES
Initial HC on  at 4w2d- repeat  10,357 at 5w3d.  Patient's JOANNE Provider: ARIELLE Stevens MD  Infertility dx:  Gestational Carrier  Achieved pregnancy by: Embryo transfer on   Fertility medications used this cycle: luteal progesterone, estrace  LMP: Patient's last menstrual period was 24  EGA based on (IUI date, ET ): embryo transfer date-- currently 5w4d    Updated G/P with this pregnancy:   OB HX:  OB History    Para Term  AB Living   4 3           SAB IAB Ectopic Multiple Live Births           3      # Outcome Date GA Lbr Teto/2nd Weight Sex Delivery Anes PTL Lv   4 Current            3 Para            2 Para            1 Para                Type & Screen: O POS  History of miscarriage: No  History of pelvic/abdominal surgeries: No  History of STDs/PID: No  Hx of ectopic: No  Hx of tubal disease/ blockage: No    Risk for ectopic: No      Current medications:     Current Outpatient Medications:     estradiol (Estrace) 2 mg tablet, Take 3 tablets (6 mg) by mouth once daily., Disp: 90 tablet, Rfl: 2    progesterone 50 mg/mL injection, Inject 1.5 mL (75 mg) into the muscle once daily. Inject into the muscle at the same time each morning as directed per provider., Disp: 50 mL, Rfl: 3      PNV: Yes  Vitamin D 2000 IUs: Yes  Luteal support: IM ROBIN 75 mg daily  Additional medications: 6mg estrace    Labs ordered/Plan:   BhCG ordered. Team will reach out to patient with results and plan.   Discussed early pregnancy versus miscarriage versus ectopic. Precautions given.   Patient expresses understanding of plan and is agreeable.    Component  Ref Range & Units 9 d ago   HCG Quant  m[iU]/mL 443.0     Component  Ref Range & Units 1 d ago   HCG Quant  m[iU]/mL 10,357.0     Viviana Moore  2024  7:45 AM    [facility] PROVIDER NOTE - HCG REVIEW  Ultrasound and/or labs reviewed at [facility].     RTC in AT APPROX 6 weeks 5 DAYS GESTATION for OB scan .  D/w   Juarez Khan  05/29/2024  1:10 PM    Results reviewed with patient as well as IP's and agency. Will send order for patient to have OB scan in [place] near home 6/6, 6/7 or 6/10. Attached order to email, told them to let me know if they need the order faxed somewhere. Told patient to continue medications (estrace and ROBIN through 10 weeks of pregnancy).    05/29/24 at 1:36 PM - Viviana Moore RN

## 2024-06-10 NOTE — PROGRESS NOTES
Patient has OB scan in [place] scheduled for today, patient is a GC for Katty Wallace and Peter Somers.   Scan scheduled for 12 (PDT), will keep an eye out for results and follow up once resulted.    06/10/24 at 9:07 AM - Viviana Moore RN    OB scan report not received yet, will place on noon [facility] for tomorrow.    06/10/24 at 5:14 PM - Viviana Moore RN

## 2024-06-11 DIAGNOSIS — Z31.41 FERTILITY TESTING: Primary | ICD-10-CM

## 2024-06-11 NOTE — PROGRESS NOTES
Patient had OB scan in [place] yesterday 6/10, patient is a GC for Katty Wallace and Peter Somers.   Awaiting results faxed to our office to review.    06/11/24 at 7:28 AM - Viviana Moore RN    Results from yesterday 6/10 in [place]: (*Based on FET date patient was 7w2d on 6/10)     US OB less than 14 weeks abdominal and transvaginal  Order: [id number]  Impression    1.  Single living intrauterine gestation. Crown-rump length of 12.5 mm corresponding to 7 weeks 3 days estimated gestational age and an estimated date of delivery of 1/24/2025 consistent and concordant with dates.    2.  Small perigestational collection which may represent a subchorionic bleed measuring 1.5 x 0.6 x 0.8 cm and involving less than half of the circumference of the gestational sac.    Exam images have been permanently archived.  Narrative    PROCEDURE: US OB < 14 WEEKS ABD AND TRANSVAG    COMPARISON STUDY: None    REASON FOR STUDY: Encounter to determine fetal viability of pregnancy    TECHNIQUE: Transabdominal and Transvaginal Ultrasound imaging performed.    FINDINGS:  There is a single living intrauterine gestation.    Gestational Sac: Intrauterine, normal shape. There is a small perigestational collection which may represent a subchorionic bleed. Its measures 1.5 x 0.6 x 0.8 cm and involves less than half of the circumference of the gestational sac.  Embryo: Crown-rump length of 12.5 mm  Fetal Heart Rate: 150 bpm  Yolk Sac: Normal, 3.7 mm  Amniotic Fluid: Unremarkable  Early Placenta: Too early to evaluate    Ultrasound EGA: 7 weeks 3 days  Ultrasound AMAN: 1/24/2025  Menstrual Age: 7 weeks 3 days    MATERNAL STRUCTURES:  Uterus: No mass. Cervix is closed  Right Ovary/Adnexa: Normal.  Left Ovary/Adnexa: Normal.  Free fluid: None.  Exam End: 06/10/24 15:53    Specimen Collected: 06/10/24 23:31 Last Resulted: 06/10/24 23:35   Received From: [facility]  Result Received: 06/11/24 10:51     06/11/24  at 10:54 AM - Viviana Moore RN      [facility] PROVIDER NOTE  Ultrasound and/or labs reviewed at [facility].     Will order progesterone due to the small subchorionic bleed.    Okay to graduate to Ob/Gyn    Maddie Andrade  06/11/2024  1:11 PM    Discussed normal ultrasound findings with GC, IP and agency. Explained there may be a very small subchorionic bleed but likely to just resolve on its own and not concerning, will grab P4 level when the patient can just to make sure it looks okay per Dr. Lowe. Patient to move to OB care, aware to continue medications with last day  being 7/5.    06/11/24 at 3:10 PM - Vivinaa Moore RN

## 2024-06-12 DIAGNOSIS — O36.80X0 ENCOUNTER TO DETERMINE FETAL VIABILITY OF PREGNANCY, NOT APPLICABLE OR UNSPECIFIED FETUS (HHS-HCC): ICD-10-CM

## 2024-06-13 ENCOUNTER — DOCUMENTATION (OUTPATIENT)
Dept: ENDOCRINOLOGY | Facility: CLINIC | Age: 34
End: 2024-06-13

## 2024-06-13 NOTE — PROGRESS NOTES
[facility] PROVIDER NOTE- PROGESTERONE CHECK  Ultrasound and/or labs reviewed at [facility].   No results found for this or any previous visit (from the past 96 hour(s)).    Component  Ref Range & Units 1 d ago   Progesterone  ng/mL 37.70       Next steps:  Continue IM progesterone at 75 mg daily  RN to communicate. Repeat OB scan ordered to be done in 7-10 days  Coby Tolliver  06/13/2024  1:27 PM

## 2024-06-17 DIAGNOSIS — N97.9 FEMALE INFERTILITY: ICD-10-CM

## 2024-06-28 NOTE — PROGRESS NOTES
Patient going for repeat OB Scan today in [place] at around 11:30am PST. May not have results faxed by the end of the day - will tentative place in noon huddle for Monday to review.      SHREE ROCK on 6/28/24 at 11:42 AM.    Results of US not yet received.   RN will send MC message to pt and follow up Monday from huddle.  Anh Holbrook 06/28/24 4:55 PM

## 2024-07-01 NOTE — PROGRESS NOTES
Repeat OB scan was completed in [place] on 6/28 results below:    EXAM DATE: 6/28/2024 11:30 AM   CLINICAL HISTORY: Encounter to determine fetal viability of pregnancy.   COMPARISONS: US OB < 14 WEEKS ABD AND TRANSVAG 06/10/2024 12:05 PM.   TECHNIQUE: Real-time transabdominal  pelvic scan was performed with static image documentation.     FINDINGS:   Gestational sac:  There is a gestational sac in the uterus containing a normal-appearing yolk sac with subjectively normal fluid volume. Mean sac diameter of 38.3 mm, corresponding with gestational age of 9 weeks 0 days.     Hypoechoic area measuring 1.9 x 1.8 x 0.9 cm adjacent to the gestational sac, most likely related to a perigestational hemorrhage. This involves less than half of the circumference of the gestational sac. This measured 1.5 x 0.8 x 0.6 cm on the prior exam.     Crown-rump length:  There is a fetal pole with a crown-rump length of 32.3 mm, corresponding to an ultrasound age of  10 weeks 1 day.     Expected clinical age is 10 weeks 0 days.     Estimated date of delivery: 01/23/2025 by crown rump length, 01/24/2025 by clinical age.   Fetal heart rate:  Fetal cardiac activity is demonstrated with a fetal heart rate of 165 bpm.   Placenta:  Not well visualized due to early gestational age. Appears to be developing posterior.     Will have Coby Tolliver review for official graduation to OB.    07/01/24 at 8:56 AM - Viviana Moore RN    OK to graduate to OB care based on these results.   Viviana Lowe 07/01/24 12:55 PM    Let patient, IP and agency know patient can move to OB care, last day of meds is Friday.  Message sent to Coby Tolliver if she would like to touch base with IP and GC regarding graduating to OB.    07/01/24 at 4:23 PM - Viviana Moore RN

## 2024-07-02 RX ORDER — ESTRADIOL 2 MG/1
6 TABLET ORAL DAILY
Qty: 90 TABLET | Refills: 1 | OUTPATIENT
Start: 2024-07-02 | End: 2025-07-02